# Patient Record
Sex: MALE | Race: WHITE | NOT HISPANIC OR LATINO | URBAN - METROPOLITAN AREA
[De-identification: names, ages, dates, MRNs, and addresses within clinical notes are randomized per-mention and may not be internally consistent; named-entity substitution may affect disease eponyms.]

---

## 2022-03-30 ENCOUNTER — NEW PATIENT (OUTPATIENT)
Dept: URBAN - METROPOLITAN AREA CLINIC 53 | Facility: CLINIC | Age: 72
End: 2022-03-30

## 2022-03-30 DIAGNOSIS — H26.491: ICD-10-CM

## 2022-03-30 DIAGNOSIS — H43.811: ICD-10-CM

## 2022-03-30 DIAGNOSIS — D31.31: ICD-10-CM

## 2022-03-30 DIAGNOSIS — H26.492: ICD-10-CM

## 2022-03-30 PROCEDURE — 92134 CPTRZ OPH DX IMG PST SGM RTA: CPT

## 2022-03-30 PROCEDURE — 92004 COMPRE OPH EXAM NEW PT 1/>: CPT

## 2022-03-30 ASSESSMENT — KERATOMETRY
OD_AXISANGLE_DEGREES: 91
OS_AXISANGLE_DEGREES: 88
OD_K1POWER_DIOPTERS: 43.75
OS_K2POWER_DIOPTERS: 45.75
OS_K1POWER_DIOPTERS: 45.00
OD_K2POWER_DIOPTERS: 45.00
OS_AXISANGLE2_DEGREES: 178
OD_AXISANGLE2_DEGREES: 1

## 2022-03-30 ASSESSMENT — VISUAL ACUITY
OS_SC: J16
OS_SC: 20/50
OD_GLARE: 20/25
OD_GLARE: 20/25
OD_SC: 20/20
OS_GLARE: 20/50
OS_GLARE: 20/50
OU_SC: J5
OD_SC: J3

## 2022-03-30 ASSESSMENT — TONOMETRY
OS_IOP_MMHG: 15
OD_IOP_MMHG: 14

## 2022-03-30 NOTE — PATIENT DISCUSSION
PCO ([id number] [place] [address]): Visually significant PCO present on exam today. Recommend YAG laser capsulotomy to improve vision and decrease glare symptoms. RBAs of procedure discussed. Patient agrees and wishes to proceed.

## 2022-04-05 ENCOUNTER — CLINIC PROCEDURE ONLY (OUTPATIENT)
Dept: URBAN - METROPOLITAN AREA CLINIC 53 | Facility: CLINIC | Age: 72
End: 2022-04-05

## 2022-04-05 DIAGNOSIS — H26.493: ICD-10-CM

## 2022-04-05 PROCEDURE — 92014 COMPRE OPH EXAM EST PT 1/>: CPT

## 2022-04-05 PROCEDURE — 66821 AFTER CATARACT LASER SURGERY: CPT

## 2022-04-05 ASSESSMENT — TONOMETRY
OS_IOP_MMHG: 16
OD_IOP_MMHG: 16
OS_IOP_MMHG: 16

## 2022-04-05 ASSESSMENT — KERATOMETRY
OS_K1POWER_DIOPTERS: 45.00
OD_AXISANGLE2_DEGREES: 1
OS_AXISANGLE2_DEGREES: 178
OS_K2POWER_DIOPTERS: 45.75
OD_AXISANGLE_DEGREES: 91
OS_AXISANGLE_DEGREES: 88
OD_K2POWER_DIOPTERS: 45.00
OD_K1POWER_DIOPTERS: 43.75

## 2022-04-05 ASSESSMENT — VISUAL ACUITY
OD_SC: 20/20
OS_SC: 20/50

## 2022-04-05 NOTE — PATIENT DISCUSSION
PCO ([id number] [place] [address]): Visually significant PCO present on exam today. Recommend YAG laser capsulotomy today to improve vision and decrease glare symptoms. RBAs of procedure discussed. Patient agrees and wishes to proceed.

## 2022-04-05 NOTE — PROCEDURE NOTE: CLINICAL
PROCEDURE NOTE: YAG Capsulotomy OS. Diagnosis: Posterior Capsular Opacification (PCO). Prep: Mydriacil 1% and Phenylephrine 2.5%. Prior to treatment, the risks/benefits/alternatives were discussed. The patient wished to proceed with procedure. Consent was signed. Proparacaine and brominidine were placed into the operative eye after the eye was dilated. Power = 1.0mJ. Number of pulses = 74. Patient tolerated procedure well and there were no complications. Post Laser instructions given. Saroj Boswell

## 2022-08-08 ENCOUNTER — FOLLOW UP (OUTPATIENT)
Dept: URBAN - METROPOLITAN AREA CLINIC 53 | Facility: CLINIC | Age: 72
End: 2022-08-08

## 2022-08-08 DIAGNOSIS — H02.834: ICD-10-CM

## 2022-08-08 DIAGNOSIS — H02.831: ICD-10-CM

## 2022-08-08 PROCEDURE — 92014 COMPRE OPH EXAM EST PT 1/>: CPT

## 2022-08-08 ASSESSMENT — TONOMETRY
OD_IOP_MMHG: 19
OS_IOP_MMHG: 17

## 2022-08-08 ASSESSMENT — VISUAL ACUITY
OU_SC: J2@16IN
OS_SC: 20/20
OD_SC: 20/20

## 2022-08-08 ASSESSMENT — KERATOMETRY
OS_AXISANGLE_DEGREES: 88
OS_K1POWER_DIOPTERS: 45.00
OD_K1POWER_DIOPTERS: 43.75
OS_AXISANGLE2_DEGREES: 178
OD_AXISANGLE2_DEGREES: 1
OD_AXISANGLE_DEGREES: 91
OD_K2POWER_DIOPTERS: 45.00
OS_K2POWER_DIOPTERS: 45.75

## 2022-10-24 ENCOUNTER — DIAGNOSTICS ONLY (OUTPATIENT)
Dept: URBAN - METROPOLITAN AREA CLINIC 53 | Facility: CLINIC | Age: 72
End: 2022-10-24

## 2022-10-24 DIAGNOSIS — H02.831: ICD-10-CM

## 2022-10-24 DIAGNOSIS — H02.834: ICD-10-CM

## 2022-10-24 PROCEDURE — 92285 EXTERNAL OCULAR PHOTOGRAPHY: CPT

## 2022-10-24 PROCEDURE — 92082 INTERMEDIATE VISUAL FIELD XM: CPT

## 2022-10-24 ASSESSMENT — KERATOMETRY
OD_K1POWER_DIOPTERS: 43.75
OD_AXISANGLE2_DEGREES: 1
OS_AXISANGLE_DEGREES: 88
OD_AXISANGLE_DEGREES: 91
OD_K2POWER_DIOPTERS: 45.00
OS_K1POWER_DIOPTERS: 45.00
OS_AXISANGLE2_DEGREES: 178
OS_K2POWER_DIOPTERS: 45.75